# Patient Record
Sex: MALE | URBAN - METROPOLITAN AREA
[De-identification: names, ages, dates, MRNs, and addresses within clinical notes are randomized per-mention and may not be internally consistent; named-entity substitution may affect disease eponyms.]

---

## 2024-09-21 ENCOUNTER — OFFICE VISIT (OUTPATIENT)
Dept: URGENT CARE | Age: 79
End: 2024-09-21
Payer: MEDICARE

## 2024-09-21 VITALS
SYSTOLIC BLOOD PRESSURE: 141 MMHG | HEART RATE: 50 BPM | RESPIRATION RATE: 19 BRPM | OXYGEN SATURATION: 98 % | DIASTOLIC BLOOD PRESSURE: 68 MMHG

## 2024-09-21 DIAGNOSIS — T78.40XA ALLERGIC REACTION, INITIAL ENCOUNTER: Primary | ICD-10-CM

## 2024-09-21 RX ORDER — DIPHENHYDRAMINE HYDROCHLORIDE 50 MG/ML
25 INJECTION INTRAMUSCULAR; INTRAVENOUS ONCE
Status: COMPLETED | OUTPATIENT
Start: 2024-09-21 | End: 2024-09-21

## 2024-09-21 RX ORDER — FAMOTIDINE 20 MG/1
20 TABLET, FILM COATED ORAL ONCE
Status: COMPLETED | OUTPATIENT
Start: 2024-09-21 | End: 2024-09-21

## 2024-09-21 RX ORDER — METHYLPREDNISOLONE 4 MG/1
TABLET ORAL
Qty: 21 TABLET | Refills: 0 | Status: SHIPPED | OUTPATIENT
Start: 2024-09-21 | End: 2024-09-28

## 2024-09-21 RX ORDER — EPINEPHRINE 0.3 MG/.3ML
1 INJECTION SUBCUTANEOUS AS NEEDED
Qty: 1 EACH | Refills: 1 | Status: SHIPPED | OUTPATIENT
Start: 2024-09-21 | End: 2025-09-21

## 2024-09-21 RX ORDER — METHYLPREDNISOLONE SODIUM SUCCINATE 125 MG/2ML
125 INJECTION INTRAMUSCULAR; INTRAVENOUS ONCE
Status: COMPLETED | OUTPATIENT
Start: 2024-09-21 | End: 2024-09-21

## 2024-09-21 RX ORDER — ATORVASTATIN CALCIUM 10 MG/1
10 TABLET, FILM COATED ORAL
COMMUNITY
Start: 2016-10-17

## 2024-09-21 RX ORDER — ASPIRIN 81 MG/1
TABLET ORAL
COMMUNITY
Start: 2016-10-17

## 2024-09-21 ASSESSMENT — ENCOUNTER SYMPTOMS: ALLERGIC REACTION: 1

## 2024-09-22 NOTE — PATIENT INSTRUCTIONS
PLEASE CALL 911 IF YOUR SYMPTOMS WORSEN INCLUDING BUT NOT LIMITED TO CHEST PAIN, WHEEZING, SHORTNESS OF BREATH, DIZZINESS, PASSING OUT OR FEELING FAINT, VOMITING, DIARRHEA, ABDOMINAL PAIN, LETHARGY, CONFUSION. THESE MAY BE SIGNS OF A SEVERE, LIFE THREATENING ALLERGIC REACTION

## 2024-09-22 NOTE — PROGRESS NOTES
Subjective   Patient ID: Trenton Espinoza is a 79 y.o. male. They present today with a chief complaint of Allergic Reaction (Multiple bee stings today pt states about 10 while outside gardening. ).    History of Present Illness  Pt presents with allergic reaction to bee stings. Was stung pta by several bees while out in yard. Hx of swelling surrounding stings in the past. He has diffuse hives/redness and itching. Denies hx of anaphylaxis to bee stings. Took 25 mg of benadryl prior to arrival. Denies fever, chills, cp, sob, wheezing, abd pain, dizziness, syncope, nv, confusion, lightheadedness swelling of face tongue or throat trouble swallowing sore throat throat tightness. His wife is present with him today.       Allergic Reaction  Presenting symptoms: rash        Past Medical History  Allergies as of 09/21/2024 - Reviewed 09/21/2024   Allergen Reaction Noted    Bee venom protein (honey bee) Hives, Itching, and Rash 09/21/2024       (Not in a hospital admission)       No past medical history on file.    No past surgical history on file.     reports that he has never smoked. He has never used smokeless tobacco. He reports that he does not drink alcohol and does not use drugs.    Review of Systems  Review of Systems   Skin:  Positive for rash.   All other systems reviewed and are negative.                                 Objective    Vitals:    09/21/24 1930   BP: (!) 129/46   BP Location: Right arm   Patient Position: Sitting   BP Cuff Size: Small adult   Pulse: 54     No LMP for male patient.    Physical Exam  Vitals and nursing note reviewed.   Constitutional:       General: He is not in acute distress.     Appearance: Normal appearance. He is not ill-appearing, toxic-appearing or diaphoretic.   HENT:      Head: Normocephalic and atraumatic.      Mouth/Throat:      Lips: Pink.      Mouth: Mucous membranes are moist. No injury, oral lesions or angioedema.      Dentition: Normal dentition.      Pharynx: Oropharynx is  clear. Uvula midline. No pharyngeal swelling, oropharyngeal exudate, posterior oropharyngeal erythema, uvula swelling or postnasal drip.      Tonsils: No tonsillar exudate or tonsillar abscesses.   Cardiovascular:      Rate and Rhythm: Normal rate.      Pulses: Normal pulses.   Pulmonary:      Effort: No respiratory distress.      Breath sounds: No wheezing, rhonchi or rales.   Musculoskeletal:      Cervical back: Full passive range of motion without pain, normal range of motion and neck supple.   Skin:     Capillary Refill: Capillary refill takes less than 2 seconds.      Findings: Rash (diffuse erythematous urticaria rash) present.   Neurological:      General: No focal deficit present.      Mental Status: He is alert and oriented to person, place, and time.         Procedures    Point of Care Test & Imaging Results from this visit  No results found for this visit on 09/21/24.   No results found.    Diagnostic study results (if any) were reviewed by Jolie Pelaez PA-C.    Assessment/Plan   Allergies, medications, history, and pertinent labs/EKGs/Imaging reviewed by Jolie Pelaez PA-C.     Medical Decision Making    Initial BP on arrival 129/46. Pt and wife states this is baseline blood pressure for him. Otherwise HDS. No angioedema noted. No sx besides diffuse hives and itching.     Case staffed with supervising DR. Silvestre.    Pt was given pepcid, cetrizine, solumedrol and benadryl in clinic    Pt was observed for 1.5 hours, rash resolved. Pt feeling a lot better. No worsening of symptoms. Vitals were checked every 15 minutes and remained stable therefore epi was not indicated at this time. Dr. Silvestre agreeable to plan    Discharged on medrol and with Epipen. Referral to allergy placed, advised follow up as soon as possible. May taking allegra/claritin/zyrtec once daily and benadryl as needed    Advised to call 911 for worsening or new/non improving sx. Advised importance of calling 911 and not attempting to  drive to emergency department.     Orders and Diagnoses  Diagnoses and all orders for this visit:  Allergic reaction, initial encounter  -     diphenhydrAMINE (BENADryl) injection 25 mg  -     cetirizine (ZYRTEC) capsule 10 mg  -     methylPREDNISolone sodium succinate (PF) (SOLU-Medrol) injection 125 mg  -     famotidine (Pepcid) tablet 20 mg  -     Referral to Allergy; Future  -     methylPREDNISolone (Medrol Dospak) 4 mg tablets; Take as directed on package.  -     EPINEPHrine (Epipen) 0.3 mg/0.3 mL injection syringe; Inject 0.3 mL (0.3 mg) into the muscle if needed for anaphylaxis. Call 911 after use.      Medical Admin Record      Patient disposition: Home    Electronically signed by Jolie Pelaez PA-C  8:37 PM